# Patient Record
Sex: FEMALE | Race: BLACK OR AFRICAN AMERICAN | NOT HISPANIC OR LATINO | URBAN - METROPOLITAN AREA
[De-identification: names, ages, dates, MRNs, and addresses within clinical notes are randomized per-mention and may not be internally consistent; named-entity substitution may affect disease eponyms.]

---

## 2018-07-06 ENCOUNTER — OUTPATIENT (OUTPATIENT)
Dept: OUTPATIENT SERVICES | Facility: HOSPITAL | Age: 48
LOS: 1 days | Discharge: HOME | End: 2018-07-06

## 2018-07-11 LAB — SURGICAL PATHOLOGY STUDY: SIGNIFICANT CHANGE UP

## 2023-02-17 PROBLEM — Z00.00 ENCOUNTER FOR PREVENTIVE HEALTH EXAMINATION: Status: ACTIVE | Noted: 2023-02-17

## 2023-03-29 ENCOUNTER — APPOINTMENT (OUTPATIENT)
Dept: HEART AND VASCULAR | Facility: CLINIC | Age: 53
End: 2023-03-29
Payer: COMMERCIAL

## 2023-03-29 VITALS
RESPIRATION RATE: 16 BRPM | OXYGEN SATURATION: 96 % | HEART RATE: 84 BPM | SYSTOLIC BLOOD PRESSURE: 136 MMHG | WEIGHT: 195 LBS | BODY MASS INDEX: 36.82 KG/M2 | DIASTOLIC BLOOD PRESSURE: 72 MMHG | TEMPERATURE: 97.7 F | HEIGHT: 61 IN

## 2023-03-29 DIAGNOSIS — I10 ESSENTIAL (PRIMARY) HYPERTENSION: ICD-10-CM

## 2023-03-29 DIAGNOSIS — Z80.1 FAMILY HISTORY OF MALIGNANT NEOPLASM OF TRACHEA, BRONCHUS AND LUNG: ICD-10-CM

## 2023-03-29 DIAGNOSIS — Z78.9 OTHER SPECIFIED HEALTH STATUS: ICD-10-CM

## 2023-03-29 DIAGNOSIS — I51.7 CARDIOMEGALY: ICD-10-CM

## 2023-03-29 DIAGNOSIS — R73.09 OTHER ABNORMAL GLUCOSE: ICD-10-CM

## 2023-03-29 DIAGNOSIS — Z83.49 FAMILY HISTORY OF OTHER ENDOCRINE, NUTRITIONAL AND METABOLIC DISEASES: ICD-10-CM

## 2023-03-29 DIAGNOSIS — R12 HEARTBURN: ICD-10-CM

## 2023-03-29 DIAGNOSIS — E78.5 HYPERLIPIDEMIA, UNSPECIFIED: ICD-10-CM

## 2023-03-29 DIAGNOSIS — R06.00 DYSPNEA, UNSPECIFIED: ICD-10-CM

## 2023-03-29 DIAGNOSIS — Z82.49 FAMILY HISTORY OF ISCHEMIC HEART DISEASE AND OTHER DISEASES OF THE CIRCULATORY SYSTEM: ICD-10-CM

## 2023-03-29 DIAGNOSIS — R01.1 CARDIAC MURMUR, UNSPECIFIED: ICD-10-CM

## 2023-03-29 PROCEDURE — 99202 OFFICE O/P NEW SF 15 MIN: CPT | Mod: 25

## 2023-03-29 PROCEDURE — 93000 ELECTROCARDIOGRAM COMPLETE: CPT

## 2023-03-29 RX ORDER — ROSUVASTATIN CALCIUM 5 MG/1
5 TABLET, FILM COATED ORAL
Qty: 30 | Refills: 5 | Status: ACTIVE | COMMUNITY
Start: 2023-03-29

## 2023-03-29 RX ORDER — LOSARTAN POTASSIUM 25 MG/1
25 TABLET, FILM COATED ORAL DAILY
Qty: 1 | Refills: 2 | Status: ACTIVE | COMMUNITY
Start: 2023-03-29

## 2023-03-29 RX ORDER — METFORMIN HYDROCHLORIDE 500 MG/1
500 TABLET, COATED ORAL TWICE DAILY
Qty: 60 | Refills: 0 | Status: ACTIVE | COMMUNITY
Start: 2023-03-29

## 2023-03-29 NOTE — ASSESSMENT
[FreeTextEntry1] : An EKG was performed to evaluate for arrhythmia and ischemia.\par \par Palpitations, murmur, SILVA-- I asked her to return for an Echocardiogram and Stress Test\par \par I encouraged continued risk factor reduction and gradual increase in aerobic activity as tolerated\par \par  32  minutes were spent discussing cardiac risk excluding procedure time

## 2023-03-29 NOTE — REVIEW OF SYSTEMS
[Dyspnea on exertion] : dyspnea during exertion [Palpitations] : palpitations [Negative] : Constitutional [Blurry Vision] : no blurred vision [Seeing Double (Diplopia)] : no diplopia [Hearing Loss] : no hearing loss [Tinnitus] : no tinnitus [SOB] : no shortness of breath [Chest Discomfort] : no chest discomfort [Lower Ext Edema] : no extremity edema [Leg Claudication] : no intermittent leg claudication [Orthopnea] : no orthopnea [PND] : no PND [Syncope] : no syncope

## 2023-04-18 ENCOUNTER — APPOINTMENT (OUTPATIENT)
Dept: HEART AND VASCULAR | Facility: CLINIC | Age: 53
End: 2023-04-18
Payer: COMMERCIAL

## 2023-04-18 DIAGNOSIS — I35.1 NONRHEUMATIC AORTIC (VALVE) INSUFFICIENCY: ICD-10-CM

## 2023-04-18 PROCEDURE — 93015 CV STRESS TEST SUPVJ I&R: CPT

## 2023-04-18 PROCEDURE — 99212 OFFICE O/P EST SF 10 MIN: CPT | Mod: 25

## 2023-04-18 PROCEDURE — 93306 TTE W/DOPPLER COMPLETE: CPT

## 2023-04-18 NOTE — ASSESSMENT
[FreeTextEntry1] : At the time of the patient's visit an Echocardiogram was performed to evaluate LV function. At the time of the visit the results were reviewed with patient\par \par At the time of the patient's visit a Stress Test was performed to evaluate for exercise induced arrhythmia and ischemia. At the time of the visit the results were reviewed with patient\par \par I encouraged continued risk factor reduction and gradual increase in aerobic activity as tolerated\par \par  17  minutes were spent discussing cardiac risk excluding procedure time \par \par

## 2024-02-16 ENCOUNTER — APPOINTMENT (OUTPATIENT)
Dept: HEART AND VASCULAR | Facility: CLINIC | Age: 54
End: 2024-02-16
Payer: COMMERCIAL

## 2024-02-16 ENCOUNTER — NON-APPOINTMENT (OUTPATIENT)
Age: 54
End: 2024-02-16

## 2024-02-16 VITALS
SYSTOLIC BLOOD PRESSURE: 130 MMHG | HEIGHT: 61 IN | DIASTOLIC BLOOD PRESSURE: 78 MMHG | WEIGHT: 186 LBS | TEMPERATURE: 98.2 F | BODY MASS INDEX: 35.12 KG/M2 | HEART RATE: 73 BPM | OXYGEN SATURATION: 95 %

## 2024-02-16 DIAGNOSIS — R07.89 OTHER CHEST PAIN: ICD-10-CM

## 2024-02-16 DIAGNOSIS — R00.2 PALPITATIONS: ICD-10-CM

## 2024-02-16 PROCEDURE — 99213 OFFICE O/P EST LOW 20 MIN: CPT | Mod: 25

## 2024-02-16 PROCEDURE — 93000 ELECTROCARDIOGRAM COMPLETE: CPT

## 2024-02-16 RX ORDER — CIMETIDINE 800 MG/1
800 TABLET, FILM COATED ORAL
Qty: 30 | Refills: 0 | Status: DISCONTINUED | COMMUNITY
Start: 2023-03-29 | End: 2024-02-16

## 2024-02-16 NOTE — HISTORY OF PRESENT ILLNESS
[FreeTextEntry1] : 53 year female who notes having a fiery sensation in her upper chest just left of the midline. It occurs infrequently, only during the day. It happens late morning or afternoon. Never when lying down. It happens when performing home chores. It last 5 minutes. It improves with sitting still. It happens when multi-tasking. She describes intermittent palpitations but not when she has her pain. Her pain started

## 2024-02-16 NOTE — ASSESSMENT
[FreeTextEntry1] : An EKG was performed to evaluate for arrhythmia and ischemia.  Atypical chest pain-- I asked the patient to return for a Stress-echocardiogram  I encouraged continued risk factor reduction and gradual increase in aerobic activity as tolerated  17   minutes were spent discussing cardiac risk excluding procedure time

## 2024-06-05 ENCOUNTER — APPOINTMENT (OUTPATIENT)
Dept: HEART AND VASCULAR | Facility: CLINIC | Age: 54
End: 2024-06-05

## 2024-07-12 ENCOUNTER — APPOINTMENT (OUTPATIENT)
Dept: HEART AND VASCULAR | Facility: CLINIC | Age: 54
End: 2024-07-12
Payer: COMMERCIAL

## 2024-07-12 DIAGNOSIS — I51.7 CARDIOMEGALY: ICD-10-CM

## 2024-07-12 DIAGNOSIS — R07.89 OTHER CHEST PAIN: ICD-10-CM

## 2024-07-12 PROCEDURE — 99213 OFFICE O/P EST LOW 20 MIN: CPT | Mod: 25

## 2024-07-12 PROCEDURE — 93351 STRESS TTE COMPLETE: CPT

## 2024-07-12 PROCEDURE — G2211 COMPLEX E/M VISIT ADD ON: CPT | Mod: NC

## 2024-07-17 ENCOUNTER — TRANSCRIPTION ENCOUNTER (OUTPATIENT)
Age: 54
End: 2024-07-17

## 2024-07-17 VITALS
SYSTOLIC BLOOD PRESSURE: 157 MMHG | DIASTOLIC BLOOD PRESSURE: 84 MMHG | WEIGHT: 185.19 LBS | TEMPERATURE: 98 F | HEIGHT: 61 IN | HEART RATE: 72 BPM | OXYGEN SATURATION: 99 % | RESPIRATION RATE: 18 BRPM

## 2024-07-17 RX ORDER — METFORMIN HYDROCHLORIDE 850 MG/1
1 TABLET, FILM COATED ORAL
Refills: 0 | DISCHARGE

## 2024-07-17 RX ORDER — MELOXICAM 7.5 MG/1
1 TABLET ORAL
Refills: 0 | DISCHARGE

## 2024-07-17 RX ORDER — LOSARTAN POTASSIUM 100 MG/1
1 TABLET, FILM COATED ORAL
Refills: 0 | DISCHARGE

## 2024-07-17 NOTE — ASU PATIENT PROFILE, ADULT - NSICDXPASTSURGICALHX_GEN_ALL_CORE_FT
PAST SURGICAL HISTORY:  H/O  section x2 ,     H/O dilation and curettage fibroids    History of cholecystectomy     Kidney stones x2

## 2024-07-18 ENCOUNTER — TRANSCRIPTION ENCOUNTER (OUTPATIENT)
Age: 54
End: 2024-07-18

## 2024-07-18 ENCOUNTER — OUTPATIENT (OUTPATIENT)
Dept: OUTPATIENT SERVICES | Facility: HOSPITAL | Age: 54
LOS: 1 days | Discharge: HOME | End: 2024-07-18
Payer: COMMERCIAL

## 2024-07-18 DIAGNOSIS — Z98.890 OTHER SPECIFIED POSTPROCEDURAL STATES: Chronic | ICD-10-CM

## 2024-07-18 DIAGNOSIS — Z98.891 HISTORY OF UTERINE SCAR FROM PREVIOUS SURGERY: Chronic | ICD-10-CM

## 2024-07-18 DIAGNOSIS — N20.0 CALCULUS OF KIDNEY: Chronic | ICD-10-CM

## 2024-07-18 DIAGNOSIS — Z90.49 ACQUIRED ABSENCE OF OTHER SPECIFIED PARTS OF DIGESTIVE TRACT: Chronic | ICD-10-CM

## 2024-07-18 LAB
BLD GP AB SCN SERPL QL: NEGATIVE — SIGNIFICANT CHANGE UP
RH IG SCN BLD-IMP: POSITIVE — SIGNIFICANT CHANGE UP

## 2024-07-18 PROCEDURE — 58542 LSH W/T/O UT 250 G OR LESS: CPT | Mod: AS

## 2024-07-18 PROCEDURE — 88305 TISSUE EXAM BY PATHOLOGIST: CPT | Mod: 26

## 2024-07-18 PROCEDURE — 88307 TISSUE EXAM BY PATHOLOGIST: CPT | Mod: 26

## 2024-07-18 DEVICE — INTERCEED 3 X 4": Type: IMPLANTABLE DEVICE | Status: FUNCTIONAL

## 2024-07-18 RX ORDER — HEPARIN SODIUM 50 [USP'U]/ML
5000 INJECTION, SOLUTION INTRAVENOUS ONCE
Refills: 0 | Status: COMPLETED | OUTPATIENT
Start: 2024-07-18 | End: 2024-07-18

## 2024-07-18 RX ORDER — ACETAMINOPHEN 325 MG
2 TABLET ORAL
Qty: 0 | Refills: 0 | DISCHARGE
Start: 2024-07-18

## 2024-07-18 RX ORDER — OXYCODONE HYDROCHLORIDE 100 MG/5ML
10 SOLUTION ORAL EVERY 4 HOURS
Refills: 0 | Status: DISCONTINUED | OUTPATIENT
Start: 2024-07-18 | End: 2024-07-19

## 2024-07-18 RX ORDER — CELECOXIB 100 MG/1
400 CAPSULE ORAL ONCE
Refills: 0 | Status: COMPLETED | OUTPATIENT
Start: 2024-07-18 | End: 2024-07-18

## 2024-07-18 RX ORDER — ACETAMINOPHEN 325 MG
1000 TABLET ORAL ONCE
Refills: 0 | Status: COMPLETED | OUTPATIENT
Start: 2024-07-18 | End: 2024-07-18

## 2024-07-18 RX ORDER — SIMETHICONE 40MG/0.6ML
80 SUSPENSION, DROPS(FINAL DOSAGE FORM)(ML) ORAL EVERY 6 HOURS
Refills: 0 | Status: DISCONTINUED | OUTPATIENT
Start: 2024-07-18 | End: 2024-07-19

## 2024-07-18 RX ORDER — HYDROMORPHONE HCL 0.2 MG/ML
0.5 INJECTION, SOLUTION INTRAVENOUS
Refills: 0 | Status: DISCONTINUED | OUTPATIENT
Start: 2024-07-18 | End: 2024-07-18

## 2024-07-18 RX ORDER — PANTOPRAZOLE SODIUM 40 MG/10ML
40 INJECTION, POWDER, FOR SOLUTION INTRAVENOUS DAILY
Refills: 0 | Status: DISCONTINUED | OUTPATIENT
Start: 2024-07-18 | End: 2024-07-19

## 2024-07-18 RX ORDER — DEXTROSE MONOHYDRATE AND SODIUM CHLORIDE 5; .3 G/100ML; G/100ML
1000 INJECTION, SOLUTION INTRAVENOUS
Refills: 0 | Status: DISCONTINUED | OUTPATIENT
Start: 2024-07-18 | End: 2024-07-19

## 2024-07-18 RX ORDER — KETOROLAC TROMETHAMINE 30 MG/ML
30 INJECTION, SOLUTION INTRAMUSCULAR EVERY 6 HOURS
Refills: 0 | Status: DISCONTINUED | OUTPATIENT
Start: 2024-07-18 | End: 2024-07-19

## 2024-07-18 RX ORDER — ONDANSETRON HYDROCHLORIDE 2 MG/ML
8 INJECTION INTRAMUSCULAR; INTRAVENOUS EVERY 6 HOURS
Refills: 0 | Status: DISCONTINUED | OUTPATIENT
Start: 2024-07-18 | End: 2024-07-19

## 2024-07-18 RX ORDER — ACETAMINOPHEN 325 MG
1000 TABLET ORAL EVERY 6 HOURS
Refills: 0 | Status: DISCONTINUED | OUTPATIENT
Start: 2024-07-18 | End: 2024-07-19

## 2024-07-18 RX ORDER — METOCLOPRAMIDE 5 MG/5ML
10 SOLUTION ORAL EVERY 6 HOURS
Refills: 0 | Status: DISCONTINUED | OUTPATIENT
Start: 2024-07-18 | End: 2024-07-19

## 2024-07-18 RX ORDER — OXYCODONE HYDROCHLORIDE 100 MG/5ML
5 SOLUTION ORAL EVERY 4 HOURS
Refills: 0 | Status: DISCONTINUED | OUTPATIENT
Start: 2024-07-18 | End: 2024-07-19

## 2024-07-18 RX ADMIN — HYDROMORPHONE HCL 0.5 MILLIGRAM(S): 0.2 INJECTION, SOLUTION INTRAVENOUS at 22:50

## 2024-07-18 RX ADMIN — Medication 1000 MILLIGRAM(S): at 13:30

## 2024-07-18 RX ADMIN — HYDROMORPHONE HCL 0.5 MILLIGRAM(S): 0.2 INJECTION, SOLUTION INTRAVENOUS at 20:19

## 2024-07-18 RX ADMIN — HYDROMORPHONE HCL 0.5 MILLIGRAM(S): 0.2 INJECTION, SOLUTION INTRAVENOUS at 23:15

## 2024-07-18 RX ADMIN — CELECOXIB 400 MILLIGRAM(S): 100 CAPSULE ORAL at 13:30

## 2024-07-18 RX ADMIN — HYDROMORPHONE HCL 0.5 MILLIGRAM(S): 0.2 INJECTION, SOLUTION INTRAVENOUS at 21:03

## 2024-07-18 RX ADMIN — HEPARIN SODIUM 5000 UNIT(S): 50 INJECTION, SOLUTION INTRAVENOUS at 13:30

## 2024-07-18 RX ADMIN — HYDROMORPHONE HCL 0.5 MILLIGRAM(S): 0.2 INJECTION, SOLUTION INTRAVENOUS at 21:07

## 2024-07-18 RX ADMIN — DEXTROSE MONOHYDRATE AND SODIUM CHLORIDE 125 MILLILITER(S): 5; .3 INJECTION, SOLUTION INTRAVENOUS at 20:19

## 2024-07-18 NOTE — BRIEF OPERATIVE NOTE - SECOND ASSIST PARTICIPATION DETAILS - (COMPONENTS OF THE PROCEDURE THAT ASSISTANT PARTICIPATED IN)
H&P Exam - Jamin Melo 32 y o  female MRN: [id number]    Unit/Bed#: [address] [place] [address] Encounter: [id number]      Assessment/Plan     Assessment:  Dysuria  Assessment & Plan  Patient complains of dysuria and frequency  She denies being diagnosed with the UTI but never picked up antibiotics  · UA  · Will also screen for gonorrhea and Chlamydia as patient was recently diagnosed with chlamydia on   * Accidental acetaminophen overdose  Assessment & Plan  Patient complains of feeling hazy and disoriented  Patient notes she has been taking 1,000 mg q6hrs consistently for 3 weeks  Note: this is a different amount that she originally admitted to taking when she first arrived  Acetaminophen level was 39 4 on admission  AST/ALT on admission   · Per toxicology, NAC loading dose 150mg/kg x 1, 12 5mg/kg/hr x 4 hrs, 6 25mg/kg/hr x 16 hrs  · Repeat CMP and Acetaminophen level in the AM  · If AST and ALT normal and acetaminophen level undetectable, NAC can be d/syeda    FEN:  Oral hydration   Replete electrolytes as needed  Normal diet   Full Code       History of Present Illness   HPI:  Jamin Melo is a 32 y o  female who presents with feeling "weird" and disoriented  She notes that this morning she woke up feeling like this  She notes that she has been taking 1,000mg an acute 6 hours for the past 3 weeks for back pain, migraines, and abdominal cramping secondary to D&C  Per chart review, patient had D&C done on 07/10/2018 for missed   Patient notes that this feeling began around 13:30 thirty today  This was also when she took the last dose of Tylenol  Patient notes that she was recently in the emergency department for back pain but eventually signed out AMA as nothing was being done  Patient notes that this overdose was accidental and she did not intend to harm herself  She does not have any suicidal or homicidal thoughts  Patient also admits to urinary frequency and dysuria    Per patient, she was diagnosed with the UTI approximately a week ago but the meds were never sent to her pharmacy  Per chart review, she was diagnosed with chlamydia on 2019 but there are no notes regarding UTI  Note:  Patient admitted to taking a different amount of Tylenol when she 1st arrived to ED  Per ED summary, the acetaminophen level did not match the amount of Tylenol she supposedly took  ED: NAC therapy started, NS 1,000 mL   Lumbar spine xray normal      PCP: Azucena Schirmer, MD    Review of Systems   Constitutional: Positive for fatigue  Negative for chills and fever  Respiratory: Negative for cough and chest tightness  Cardiovascular: Negative for chest pain and palpitations  Gastrointestinal: Positive for constipation, nausea and vomiting  Negative for abdominal pain and diarrhea  Genitourinary: Positive for dysuria and frequency  Neurological: Positive for dizziness and headaches  Negative for syncope  Historical Information   Past Medical History:   Diagnosis Date    Anemia     Bipolar 1 disorder ([address] )     Obesity     Psychiatric disorder     bipolar    UTI (urinary tract infection) 2019    Varicella     Child Hx     Past Surgical History:   Procedure Laterality Date     SECTION      OH  DELIVERY ONLY Bilateral 10/21/2017    Procedure:  SECTION (); Surgeon: Suzan Contreras MD;  [facility];  Service: Obstetrics    OH LAP,DIAGNOSTIC ABDOMEN N/A 2016    Procedure: EXPLORATORY LAPAROTOMY, LEFT SALPINGECTOMY;  Surgeon: Ellie Blank MD;  Location:  MAIN OR;  Service: Gynecology    OH SURG RX MISSED ABORTN,1ST TRI N/A 7/10/2019    Procedure: DILATATION AND EVACUATION (D&E) (8 WEEKS);   Surgeon: Faizan Wooten MD;  Location: [facility] OR;  Service: Gynecology     Social History   Social History     Substance and Sexual Activity   Alcohol Use Yes    Frequency: 2-4 times a month     Social History     Substance and Sexual Activity Drug Use No     Social History     Tobacco Use   Smoking Status Current Every Day Smoker    Packs/day: 0 25   Smokeless Tobacco Never Used     Family History: non-contributory    Meds/Allergies   all medications and allergies reviewed  No Known Allergies    Objective   Vitals: Blood pressure 132/77, pulse 55, temperature (!) 97 1 °F (36 2 °C), temperature source Tympanic, resp  rate 16, last menstrual period 05/01/2019, SpO2 100 %, not currently breastfeeding  No intake or output data in the 24 hours ending 08/07/19 1630    Invasive Devices     Peripheral Intravenous Line            Peripheral IV 08/07/19 Left Antecubital less than 1 day                Physical Exam   Constitutional: She appears well-developed and well-nourished  No distress  Cardiovascular: Normal rate and regular rhythm  Exam reveals no gallop and no friction rub  Pulmonary/Chest: No respiratory distress  Abdominal: Soft  Bowel sounds are normal  She exhibits no distension  There is no hepatomegaly  There is no guarding  Psychiatric: She has a normal mood and affect  Her speech is normal and behavior is normal  Judgment and thought content normal  Cognition and memory are normal  She expresses no homicidal and no suicidal ideation  Lab Results: I have personally reviewed pertinent reports       Results for orders placed or performed during the hospital encounter of 08/07/19   CBC and differential   Result Value Ref Range    WBC 7 12 4 31 - 10 16 Thousand/uL    RBC 4 21 3 81 - 5 12 Million/uL    Hemoglobin 10 4 (L) 11 5 - 15 4 g/dL    Hematocrit 35 7 34 8 - 46 1 %    MCV 85 82 - 98 fL    MCH 24 7 (L) 26 8 - 34 3 pg    MCHC 29 1 (L) 31 4 - 37 4 g/dL    RDW 18 8 (H) 11 6 - 15 1 %    MPV 10 1 8 9 - 12 7 fL    Platelets 433 493 - 132 Thousands/uL    nRBC 0 /100 WBCs    Neutrophils Relative 51 43 - 75 %    Immat GRANS % 0 0 - 2 %    Lymphocytes Relative 39 14 - 44 %    Monocytes Relative 8 4 - 12 %    Eosinophils Relative 2 0 - 6 % Basophils Relative 0 0 - 1 %    Neutrophils Absolute 3 54 1 85 - 7 62 Thousands/µL    Immature Grans Absolute 0 03 0 00 - 0 20 Thousand/uL    Lymphocytes Absolute 2 76 0 60 - 4 47 Thousands/µL    Monocytes Absolute 0 60 0 17 - 1 22 Thousand/µL    Eosinophils Absolute 0 16 0 00 - 0 61 Thousand/µL    Basophils Absolute 0 03 0 00 - 0 10 Thousands/µL   Comprehensive metabolic panel   Result Value Ref Range    Sodium 140 136 - 145 mmol/L    Potassium 3 8 3 5 - 5 3 mmol/L    Chloride 104 100 - 108 mmol/L    CO2 26 21 - 32 mmol/L    ANION GAP 10 4 - 13 mmol/L    BUN 9 5 - 25 mg/dL    Creatinine 0 77 0 60 - 1 30 mg/dL    Glucose 85 65 - 140 mg/dL    Calcium 8 7 8 3 - 10 1 mg/dL    AST 23 5 - 45 U/L    ALT 20 12 - 78 U/L    Alkaline Phosphatase 76 46 - 116 U/L    Total Protein 7 6 6 4 - 8 2 g/dL    Albumin 3 8 3 5 - 5 0 g/dL    Total Bilirubin 0 40 0 20 - 1 00 mg/dL    eGFR 122 ml/min/1 73sq m   Acetaminophen level-"If concentration is detectable, please discuss with medical  on call "   Result Value Ref Range    Acetaminophen Level 39 4 (H) 10 - 20 ug/mL   Rapid drug screen, urine   Result Value Ref Range    Amph/Meth UR Negative Negative    Barbiturate Ur Negative Negative    Benzodiazepine Urine Negative Negative    Cocaine Urine Negative Negative    Methadone Urine Negative Negative    Opiate Urine Negative Negative    PCP Ur Negative Negative    THC Urine Negative Negative   Protime-INR   Result Value Ref Range    Protime 10 1 9 4 - 11 7 seconds    INR 0 96 0 86 - 1 16   APTT   Result Value Ref Range    PTT 29 24 - 33 seconds   POCT pregnancy, urine   Result Value Ref Range    EXT PREG TEST UR (Ref: Negative) negative     Control valid      Imaging: I have personally reviewed pertinent reports  No orders to display     EKG, Pathology, and Other Studies: I have personally reviewed pertinent reports        Code Status: Level 1 - Full Code  Advance Directive and Living Will:      Power of :    POLST: Counseling / Coordination of Care  Total floor / unit time spent today 25 minutes  Greater than 50% of total time was spent with the patient and / or family counseling and / or coordination of care  I acted within this role throughout the entirety of the procedure performed by the primary surgeon

## 2024-07-18 NOTE — DISCHARGE NOTE PROVIDER - NSDCMRMEDTOKEN_GEN_ALL_CORE_FT
acetaminophen 500 mg oral tablet: 2 tab(s) orally every 6 hours  losartan 50 mg oral tablet: 1 tab(s) orally once a day  meloxicam 10 mg oral capsule: 1 cap(s) orally once a day  metFORMIN 500 mg oral tablet: 1 tab(s) orally 2 times a day

## 2024-07-18 NOTE — BRIEF OPERATIVE NOTE - OPERATION/FINDINGS
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

## 2024-07-18 NOTE — DISCHARGE NOTE PROVIDER - HOSPITAL COURSE
Patient underwent a RA GLENROY, BS, hysteroscopy D&C complicated by extensive adhesive disease to abdominal wall and bladder. Bladder was backfilled and cystoscopy normal. [id number]. Patient’s postoperative course was unremarkable and she remained hemodynamically stable and afebrile throughout. Upon discharge on POD#1, the patient is ambulating and voiding spontaneously, tolerating oral intake, pain was well controlled with oral medication, and vital signs were stable.

## 2024-07-18 NOTE — ASU DISCHARGE PLAN (ADULT/PEDIATRIC) - CARE PROVIDER_API CALL
Ousmane [place]  Obstetrics and Gynecology  [address] [place] [address], [address] [place] [id number]  Phone: [phone]  Fax: [phone]  Established Patient  Follow Up Time:

## 2024-07-18 NOTE — CHART NOTE - NSCHARTNOTEFT_GEN_A_CORE
GYN POC  Patient 54yo s/p RA GLENROY, BS, hysteroscopy D&C complicated by extensive adhesive disease seen at bedside. Patient reports she currently has no pain. She has not attempted to have any water or crackers yet but at baseline she does not have any nausea or vomiting.   Pt denies any fever, chills, chest pain, SOB.     T(F): 98.7 (07-18-24 @ 19:22), Max: 98.7 (07-18-24 @ 19:22)  HR: 78 (07-18-24 @ 22:07) (72 - 89)  BP: 139/65 (07-18-24 @ 22:07) (134/60 - 157/84)  RR: 21 (07-18-24 @ 22:07) (18 - 27)  SpO2: 92% (07-18-24 @ 22:07) (92% - 100%)    07-18 @ 07:01  -  07-18 @ 22:52  --------------------------------------------------------  IN: 1875 mL / OUT: 1100 mL / NET: 775 mL        acetaminophen     Tablet .. 1000 milliGRAM(s) Oral every 6 hours  ketorolac   Injectable 30 milliGRAM(s) IV Push every 6 hours  lactated ringers. 1000 milliLiter(s) IV Continuous <Continuous>  metoclopramide Injectable 10 milliGRAM(s) IV Push every 6 hours PRN Nausea and/or Vomiting  ondansetron Injectable 8 milliGRAM(s) IV Push every 6 hours PRN Nausea and/or Vomiting  oxyCODONE    IR 5 milliGRAM(s) Oral every 4 hours PRN Moderate Pain (4 - 6)  oxyCODONE    IR 10 milliGRAM(s) Oral every 4 hours PRN Severe Pain (7 - 10)  pantoprazole  Injectable 40 milliGRAM(s) IV Push daily  simethicone 80 milliGRAM(s) Chew every 6 hours      Physical exam:  Constitutional: NAD  Pulmonary: no incr. WOB  Abdomen: incision sites clean, dry and intact, abdomen soft, mildly tender, moderately distended.  Extremities: no lower extremity edema, or calf tenderness w/ SCDs in places    A:   Patient 54yo s/p RA GLENROY, BS, hysteroscopy D&C complicated by extensive adhesive disease, currently being admitted to GYN service overnight for postoperative monitoring and pain control.   - Pain currently well controlled.   - : s/p saravia, passed TOV and voiding spontaneously.   - Plan for AM CBC . Starting Hgb 10.0.

## 2024-07-18 NOTE — DISCHARGE NOTE PROVIDER - NSDCCPTREATMENT_GEN_ALL_CORE_FT
PRINCIPAL PROCEDURE  Procedure: Robot-assisted laparoscopic supracervical hysterectomy with cystoscopy  Findings and Treatment:       SECONDARY PROCEDURE  Procedure: Bilateral salpingectomy  Findings and Treatment:

## 2024-07-18 NOTE — DISCHARGE NOTE PROVIDER - NSDCHOSPICE_GEN_A_CORE
Refill Authorization Note   Domonique Kellogg  is requesting a refill authorization.  Brief Assessment and Rationale for Refill:  Approve     Medication Therapy Plan:       Medication Reconciliation Completed: No   Comments:   --->Care Gap information included below if applicable.   Orders Placed This Encounter    pravastatin (PRAVACHOL) 40 MG tablet      Requested Prescriptions   Signed Prescriptions Disp Refills    pravastatin (PRAVACHOL) 40 MG tablet 90 tablet 3     Sig: Take 1 tablet (40 mg total) by mouth once daily.       Cardiovascular:  Antilipid - Statins Passed - 1/11/2022  3:09 PM        Passed - Patient is at least 18 years old        Passed - Valid encounter within last 15 months     Recent Visits  Date Type Provider Dept   01/03/22 Office Visit Nicolas Pacheco MD [facility]   11/18/21 Office Visit Nicolas Pacheco MD [facility]   12/03/20 Office Visit Nicolas Pacheco MD [facility]   11/02/20 Office Visit Nicolas Pacheco MD [facility]   07/01/20 Office Visit Nicolas Pacheco MD [facility]   03/04/20 Office Visit Nicolas Pacheco MD [facility]   02/17/20 Office Visit Nicolas Pacheco MD [facility]   Showing recent visits within past 720 days and meeting all other requirements  Future Appointments  No visits were found meeting these conditions.  Showing future appointments within next 150 days and meeting all other requirements      Future Appointments              Tomorrow [facility] INTERVENTIONAL RADIOLOGY Abdiel [facility] Intervradiology 6th Fl, [facility]    In 1 week LAB, HEMON CANCER BLDG [facility] - Lab 3rd Fl, [place] [place]    In 1 week Claudia Young MD [facility] - Hem Onc 2nd Fl, [facility]    In 1 week CHEMO 32, [facility]; NURSE 10, [facility] CHEMO [facility] - Infusion, [place]    In 3 weeks Katie Ledesma, PhD [facility] - Psychiatry, [place]    In 3 weeks COVID TESTING,  [place] PRE-ADMIT [facility]    In 1 month MD [facility]- Palliative Medicine 3rd Fl, [facility]iel Babb    In 2 months [facility] OIC-XRAY [facility]iel [facility] - Imaging Center, Imaging Ctr    In 2 months INJECTION, INFECTIOUS DISEASES [facility] Infectious Disease 1st Fl, [facility] [facility]                Passed - ALT is 131 or below and within 360 days     ALT   Date Value Ref Range Status   01/18/2022 69 (H) 10 - 44 U/L Final   01/12/2022 85 (H) 10 - 44 U/L Final   01/03/2022 48 (H) 10 - 44 U/L Final              Passed - AST is 119 or below and within 360 days     AST   Date Value Ref Range Status   01/18/2022 58 (H) 10 - 40 U/L Final   01/12/2022 57 (H) 10 - 40 U/L Final   01/03/2022 59 (H) 10 - 40 U/L Final              Passed - Total Cholesterol within 360 days     Lab Results   Component Value Date    CHOL 139 11/15/2021    CHOL 141 10/30/2020    CHOL 148 10/03/2019              Passed - LDL within 360 days     LDL Cholesterol   Date Value Ref Range Status   11/15/2021 92.6 63.0 - 159.0 mg/dL Final     Comment:     The National Cholesterol Education Program (NCEP) has set the  following guidelines (reference values) for LDL Cholesterol:  Optimal.......................<130 mg/dL  Borderline High...............130-159 mg/dL  High..........................160-189 mg/dL  Very High.....................>190 mg/dL              Passed - HDL within 360 days     HDL   Date Value Ref Range Status   11/15/2021 36 (L) 40 - 75 mg/dL Final     Comment:     The National Cholesterol Education Program (NCEP) has set the  following guidelines (reference values) for HDL Cholesterol:  Low...............<40 mg/dL  Optimal...........>60 mg/dL              Passed - Triglycerides within 360 days     Lab Results   Component Value Date    TRIG 52 11/15/2021    TRIG 54 10/30/2020    TRIG 55 10/03/2019                  Appointments  past 12m or future 3m with PCP    Date Provider   Last Visit   1/3/2022 Nicolas Pacheco MD    Next Visit   Visit date not found Nicolas Pacheco MD   ED visits in past 90 days: 0     Note composed:1:09 PM 01/18/2022          No

## 2024-07-18 NOTE — PRE-ANESTHESIA EVALUATION ADULT - NSANTHTOBACCOSD_GEN_ALL_CORE
Laboratory monitoring for mood stabilizer and antipsychotics:    Recommended baseline monitoring has been completed based on this patient's current medication regimen. The patient is currently taking the following medication(s):   Current Facility-Administered Medications   Medication Dose Route Frequency    gabapentin (NEURONTIN) capsule 300 mg  300 mg Oral BID    ARIPiprazole (ABILIFY) tablet 15 mg  15 mg Oral DAILY    aspirin delayed-release tablet 81 mg  81 mg Oral DAILY    buprenorphine-naloxone (SUBOXONE) 8-2mg SL tablet  3 Tablet SubLINGual DAILY    sertraline (ZOLOFT) tablet 200 mg  200 mg Oral DAILY       Height, Weight, BMI Estimation  Estimated body mass index is 45.73 kg/m² as calculated from the following:    Height as of this encounter: 157.5 cm (62\"). Weight as of this encounter: 113.4 kg (250 lb). Renal Function, Hepatic Function and Chemistry  Estimated Creatinine Clearance: 108.3 mL/min (by C-G formula based on SCr of 0.83 mg/dL). Lab Results   Component Value Date/Time    Sodium 139 04/21/2023 05:05 PM    Potassium 3.7 04/21/2023 05:05 PM    Chloride 103 04/21/2023 05:05 PM    CO2 26 04/21/2023 05:05 PM    Anion gap 10 04/21/2023 05:05 PM    Glucose 94 04/21/2023 05:05 PM    BUN 13 04/21/2023 05:05 PM    Creatinine 0.83 04/21/2023 05:05 PM    BUN/Creatinine ratio 16 04/21/2023 05:05 PM    GFR est AA >60 05/11/2022 03:45 AM    GFR est non-AA >60 05/11/2022 03:45 AM    Calcium 8.5 04/21/2023 05:05 PM    ALT (SGPT) 15 04/21/2023 05:05 PM    Alk.  phosphatase 72 04/21/2023 05:05 PM    Protein, total 7.4 04/21/2023 05:05 PM    Albumin 3.8 04/21/2023 05:05 PM    Globulin 3.6 04/21/2023 05:05 PM    A-G Ratio 1.1 04/21/2023 05:05 PM    Bilirubin, total 0.4 04/21/2023 05:05 PM       Lab Results   Component Value Date/Time    Glucose 94 04/21/2023 05:05 PM       Lab Results   Component Value Date/Time    Hemoglobin A1c 5.2 04/25/2023 05:27 AM       Hematology  Lab Results   Component Value Date/Time    WBC 7.2 04/21/2023 05:05 PM    HGB 12.4 04/21/2023 05:05 PM    HCT 38.1 04/21/2023 05:05 PM    PLATELET 332 24/60/4868 05:05 PM    MCV 93.8 04/21/2023 05:05 PM       Lipids  Lab Results   Component Value Date/Time    Cholesterol, total 186 04/25/2023 05:27 AM    HDL Cholesterol 41 04/25/2023 05:27 AM    LDL, calculated 106.2 (H) 04/25/2023 05:27 AM    Triglyceride 194 (H) 04/25/2023 05:27 AM    CHOL/HDL Ratio 4.5 04/25/2023 05:27 AM       Thyroid Function  Lab Results   Component Value Date/Time    TSH 2.17 04/25/2023 05:27 AM     Vitals  Visit Vitals  /77 (BP 1 Location: Right arm, BP Patient Position: Sitting)   Pulse 74   Temp 97.7 °F (36.5 °C)   Resp 18   Ht 157.5 cm (62\")   Wt 113.4 kg (250 lb)   SpO2 98%   Breastfeeding No   BMI 45.73 kg/m²       Pregnancy Test  Lab Results   Component Value Date/Time    Pregnancy test,urine (POC) Negative 04/21/2023 07:39 PM    HCG, Ql. Negative 05/09/2022 07:47 PM       Nicole Hernandes, PHARMD, BCPS, BCPP  [phone] (pharmacy) No

## 2024-07-18 NOTE — DISCHARGE NOTE PROVIDER - NSDCFUADDINST_GEN_ALL_CORE_FT
Pelvic rest (nothing in vagina) x6 weeks or unless otherwise instructed by physician. Schedule follow up appointment with Dr. [place] in 1-2 weeks.   Go to nearest ED if fever >100.4 F, severe abdominal pain or heavy vaginal bleeding.   Wound care: Showering is allowed, no baths. Do not scrub incision area. Pat and dry all areas where incisions are.   Take Motrin 600mg every 6 hours or Tylenol 1000mg every 8 hours as needed for pain

## 2024-07-18 NOTE — ASU DISCHARGE PLAN (ADULT/PEDIATRIC) - PROCEDURE
robotic-assisted laparoscopic supracervical hysterectomy, bilateral salpingectomy, hysteroscopy dilation and curettage, cystoscopy

## 2024-07-18 NOTE — BRIEF OPERATIVE NOTE - NSICDXBRIEFPROCEDURE_GEN_ALL_CORE_FT
PROCEDURES:  Robot-assisted laparoscopic supracervical hysterectomy with cystoscopy 18-Jul-2024 19:41:10  Keo Ocampo  Bilateral salpingectomy 18-Jul-2024 19:41:31  Keo Ocampo

## 2024-07-18 NOTE — DISCHARGE NOTE PROVIDER - CARE PROVIDER_API CALL
Ousmane [place]  Obstetrics and Gynecology  [address] [place] [address], [address] [place] [id number]  Phone: [phone]  Fax: [phone]  Established Patient  Follow Up Time:

## 2024-07-19 VITALS
OXYGEN SATURATION: 98 % | DIASTOLIC BLOOD PRESSURE: 61 MMHG | HEART RATE: 66 BPM | SYSTOLIC BLOOD PRESSURE: 113 MMHG | TEMPERATURE: 98 F | RESPIRATION RATE: 17 BRPM

## 2024-07-19 LAB
BASOPHILS # BLD AUTO: 0 K/UL — SIGNIFICANT CHANGE UP (ref 0–0.2)
BASOPHILS NFR BLD AUTO: 0 % — SIGNIFICANT CHANGE UP (ref 0–2)
EOSINOPHIL # BLD AUTO: 0 K/UL — SIGNIFICANT CHANGE UP (ref 0–0.5)
EOSINOPHIL NFR BLD AUTO: 0 % — SIGNIFICANT CHANGE UP (ref 0–6)
HCT VFR BLD CALC: 36 % — SIGNIFICANT CHANGE UP (ref 34.5–45)
HGB BLD-MCNC: 11.1 G/DL — LOW (ref 11.5–15.5)
IMM GRANULOCYTES NFR BLD AUTO: 0.3 % — SIGNIFICANT CHANGE UP (ref 0–0.9)
LYMPHOCYTES # BLD AUTO: 0.86 K/UL — LOW (ref 1–3.3)
LYMPHOCYTES # BLD AUTO: 8.7 % — LOW (ref 13–44)
MCHC RBC-ENTMCNC: 25.9 PG — LOW (ref 27–34)
MCHC RBC-ENTMCNC: 30.8 GM/DL — LOW (ref 32–36)
MCV RBC AUTO: 83.9 FL — SIGNIFICANT CHANGE UP (ref 80–100)
MONOCYTES # BLD AUTO: 0.46 K/UL — SIGNIFICANT CHANGE UP (ref 0–0.9)
MONOCYTES NFR BLD AUTO: 4.6 % — SIGNIFICANT CHANGE UP (ref 2–14)
NEUTROPHILS # BLD AUTO: 8.56 K/UL — HIGH (ref 1.8–7.4)
NEUTROPHILS NFR BLD AUTO: 86.4 % — HIGH (ref 43–77)
NRBC # BLD: 0 /100 WBCS — SIGNIFICANT CHANGE UP (ref 0–0)
PLATELET # BLD AUTO: 231 K/UL — SIGNIFICANT CHANGE UP (ref 150–400)
RBC # BLD: 4.29 M/UL — SIGNIFICANT CHANGE UP (ref 3.8–5.2)
RBC # FLD: 13.6 % — SIGNIFICANT CHANGE UP (ref 10.3–14.5)
WBC # BLD: 9.91 K/UL — SIGNIFICANT CHANGE UP (ref 3.8–10.5)
WBC # FLD AUTO: 9.91 K/UL — SIGNIFICANT CHANGE UP (ref 3.8–10.5)

## 2024-07-19 PROCEDURE — 82962 GLUCOSE BLOOD TEST: CPT

## 2024-07-19 PROCEDURE — 58558 HYSTEROSCOPY BIOPSY: CPT | Mod: XS

## 2024-07-19 PROCEDURE — 58662 LAPAROSCOPY EXCISE LESIONS: CPT

## 2024-07-19 PROCEDURE — 58544 LSH W/T/O UTERUS ABOVE 250 G: CPT

## 2024-07-19 PROCEDURE — 36415 COLL VENOUS BLD VENIPUNCTURE: CPT

## 2024-07-19 PROCEDURE — 85025 COMPLETE CBC W/AUTO DIFF WBC: CPT

## 2024-07-19 PROCEDURE — S2900: CPT

## 2024-07-19 PROCEDURE — C1765: CPT

## 2024-07-19 PROCEDURE — 86900 BLOOD TYPING SEROLOGIC ABO: CPT

## 2024-07-19 PROCEDURE — C9399: CPT

## 2024-07-19 PROCEDURE — 86901 BLOOD TYPING SEROLOGIC RH(D): CPT

## 2024-07-19 PROCEDURE — 86850 RBC ANTIBODY SCREEN: CPT

## 2024-07-19 RX ADMIN — Medication 1000 MILLIGRAM(S): at 05:54

## 2024-07-19 RX ADMIN — KETOROLAC TROMETHAMINE 30 MILLIGRAM(S): 30 INJECTION, SOLUTION INTRAMUSCULAR at 00:23

## 2024-07-19 RX ADMIN — Medication 1000 MILLIGRAM(S): at 11:29

## 2024-07-19 RX ADMIN — Medication 1000 MILLIGRAM(S): at 00:23

## 2024-07-19 RX ADMIN — OXYCODONE HYDROCHLORIDE 10 MILLIGRAM(S): 100 SOLUTION ORAL at 04:23

## 2024-07-19 RX ADMIN — KETOROLAC TROMETHAMINE 30 MILLIGRAM(S): 30 INJECTION, SOLUTION INTRAMUSCULAR at 05:54

## 2024-07-19 RX ADMIN — Medication 80 MILLIGRAM(S): at 11:29

## 2024-07-19 RX ADMIN — Medication 80 MILLIGRAM(S): at 00:23

## 2024-07-19 RX ADMIN — KETOROLAC TROMETHAMINE 30 MILLIGRAM(S): 30 INJECTION, SOLUTION INTRAMUSCULAR at 14:10

## 2024-07-19 RX ADMIN — OXYCODONE HYDROCHLORIDE 10 MILLIGRAM(S): 100 SOLUTION ORAL at 03:23

## 2024-07-19 RX ADMIN — Medication 80 MILLIGRAM(S): at 05:54

## 2024-07-19 NOTE — PROGRESS NOTE ADULT - ASSESSMENT
52yo PMH cHTN, preDM vs T2DM s/p JOHN TIM, BS, hysteroscopy D&C c/b extensive adhesive disease to abd wall & bladder bladder backfill and cysto wnl    Neuro: tylenol/toradol, oxy prn  CV: VSS, HTN- holding home meds   Pulm: ORA  GI: LFD, , simethicone, PPI, zofran/reglan PRN -/-  GYN: s/p JOHN TIM-BS  : s/p saravia , voiding   Endo: T2DM?- holding metformin   Heme: Hgb 10  VTE ppx: SCD     F/u AM labs

## 2024-07-19 NOTE — PROGRESS NOTE ADULT - SUBJECTIVE AND OBJECTIVE BOX
Pt seen and examined at bedside. Pt states mild abdominal pain. Pt is ambulating, tolerating reg diet, not yet passing flatus, urinating adequately.   Pt denies fever, chills, chest pain, SOB, nausea, vomiting, lightheadedness, dizziness.      T(F): 98.4 (07-19-24 @ 05:48), Max: 98.7 (07-18-24 @ 19:22)  HR: 65 (07-19-24 @ 05:48) (59 - 89)  BP: 109/65 (07-19-24 @ 05:48) (109/65 - 157/84)  RR: 18 (07-19-24 @ 05:48) (18 - 27)  SpO2: 91% (07-19-24 @ 05:48) (91% - 100%)  Wt(kg): --  I&O's Summary    18 Jul 2024 07:01  -  19 Jul 2024 07:00  --------------------------------------------------------  IN: 2900 mL / OUT: 1950 mL / NET: 950 mL        MEDICATIONS  (STANDING):  acetaminophen     Tablet .. 1000 milliGRAM(s) Oral every 6 hours  ketorolac   Injectable 30 milliGRAM(s) IV Push every 6 hours  lactated ringers. 1000 milliLiter(s) (125 mL/Hr) IV Continuous <Continuous>  pantoprazole  Injectable 40 milliGRAM(s) IV Push daily  simethicone 80 milliGRAM(s) Chew every 6 hours    MEDICATIONS  (PRN):  metoclopramide Injectable 10 milliGRAM(s) IV Push every 6 hours PRN Nausea and/or Vomiting  ondansetron Injectable 8 milliGRAM(s) IV Push every 6 hours PRN Nausea and/or Vomiting  oxyCODONE    IR 5 milliGRAM(s) Oral every 4 hours PRN Moderate Pain (4 - 6)  oxyCODONE    IR 10 milliGRAM(s) Oral every 4 hours PRN Severe Pain (7 - 10)      Physical Exam:  Constitutional: NAD  Pulmonary: no increased work of breathing  Cardiovascular: Regular rate and rhythm   Abdomen: incision site clean, dry, intact. Soft, mildly tender, nondistended, no guarding, no rebound, +bowel sounds  Extremities: no lower extremity edema or calf tenderness. SCDs in place     LABS:                        11.1   9.91  )-----------( 231      ( 19 Jul 2024 05:30 )             36.0

## 2024-07-30 LAB — SURGICAL PATHOLOGY STUDY: SIGNIFICANT CHANGE UP
